# Patient Record
Sex: FEMALE
[De-identification: names, ages, dates, MRNs, and addresses within clinical notes are randomized per-mention and may not be internally consistent; named-entity substitution may affect disease eponyms.]

---

## 2018-03-11 ENCOUNTER — HOSPITAL ENCOUNTER (EMERGENCY)
Dept: HOSPITAL 14 - H.ER | Age: 28
Discharge: HOME | End: 2018-03-11
Payer: COMMERCIAL

## 2018-03-11 VITALS
OXYGEN SATURATION: 98 % | SYSTOLIC BLOOD PRESSURE: 123 MMHG | HEART RATE: 92 BPM | TEMPERATURE: 98 F | DIASTOLIC BLOOD PRESSURE: 96 MMHG | RESPIRATION RATE: 16 BRPM

## 2018-03-11 DIAGNOSIS — R30.0: Primary | ICD-10-CM

## 2018-03-11 DIAGNOSIS — R10.2: ICD-10-CM

## 2018-03-11 LAB
ALBUMIN SERPL-MCNC: 4.4 G/DL (ref 3.5–5)
ALBUMIN/GLOB SERPL: 1.2 {RATIO} (ref 1–2.1)
ALT SERPL-CCNC: 26 U/L (ref 9–52)
AST SERPL-CCNC: 25 U/L (ref 14–36)
BASOPHILS # BLD AUTO: 0.1 K/UL (ref 0–0.2)
BASOPHILS NFR BLD: 0.8 % (ref 0–2)
BILIRUB UR-MCNC: NEGATIVE MG/DL
BUN SERPL-MCNC: 12 MG/DL (ref 7–17)
CALCIUM SERPL-MCNC: 9.6 MG/DL (ref 8.4–10.2)
COLOR UR: (no result)
EOSINOPHIL # BLD AUTO: 0.2 K/UL (ref 0–0.7)
EOSINOPHIL NFR BLD: 1.6 % (ref 0–4)
ERYTHROCYTE [DISTWIDTH] IN BLOOD BY AUTOMATED COUNT: 13.6 % (ref 11.5–14.5)
GFR NON-AFRICAN AMERICAN: > 60
GLUCOSE UR STRIP-MCNC: (no result) MG/DL
HGB BLD-MCNC: 12.9 G/DL (ref 12–16)
LEUKOCYTE ESTERASE UR-ACNC: (no result) LEU/UL
LYMPHOCYTES # BLD AUTO: 2.3 K/UL (ref 1–4.3)
LYMPHOCYTES NFR BLD AUTO: 20.6 % (ref 20–40)
MCH RBC QN AUTO: 29.5 PG (ref 27–31)
MCHC RBC AUTO-ENTMCNC: 33.5 G/DL (ref 33–37)
MCV RBC AUTO: 88.1 FL (ref 81–99)
MONOCYTES # BLD: 0.7 K/UL (ref 0–0.8)
MONOCYTES NFR BLD: 6.2 % (ref 0–10)
NEUTROPHILS # BLD: 7.8 K/UL (ref 1.8–7)
NEUTROPHILS NFR BLD AUTO: 70.8 % (ref 50–75)
NRBC BLD AUTO-RTO: 0.1 % (ref 0–0)
PH UR STRIP: 6 [PH] (ref 5–8)
PLATELET # BLD: 319 K/UL (ref 130–400)
PMV BLD AUTO: 7.5 FL (ref 7.2–11.7)
PROT UR STRIP-MCNC: NEGATIVE MG/DL
RBC # BLD AUTO: 4.39 MIL/UL (ref 3.8–5.2)
RBC # UR STRIP: NEGATIVE /UL
SP GR UR STRIP: 1.01 (ref 1–1.03)
SQUAMOUS EPITHIAL: 1 /HPF (ref 0–5)
URINE CLARITY: CLEAR
UROBILINOGEN UR-MCNC: (no result) MG/DL (ref 0.2–1)
WBC # BLD AUTO: 11 K/UL (ref 4.8–10.8)

## 2018-03-11 PROCEDURE — 99282 EMERGENCY DEPT VISIT SF MDM: CPT

## 2018-03-11 PROCEDURE — 81025 URINE PREGNANCY TEST: CPT

## 2018-03-11 PROCEDURE — 81003 URINALYSIS AUTO W/O SCOPE: CPT

## 2018-03-11 PROCEDURE — 80053 COMPREHEN METABOLIC PANEL: CPT

## 2018-03-11 PROCEDURE — 87086 URINE CULTURE/COLONY COUNT: CPT

## 2018-03-11 PROCEDURE — 85025 COMPLETE CBC W/AUTO DIFF WBC: CPT

## 2018-03-11 PROCEDURE — 76830 TRANSVAGINAL US NON-OB: CPT

## 2018-03-11 NOTE — US
PROCEDURE:  



HISTORY:

right adenxal pain, r/o torsion



COMPARISON:





TECHNIQUE:





FINDINGS:

The uterus measures 6.9 x 3.1 x 4.2 centimeters.  The endometrium 

measures 3 millimeters.  The right ovary measures 4.0 x 1.5 x 2.7 

centimeters.  Left ovary is 3.2 x 2.6 x 2.8 centimeters.  Multiple 

bilateral ovarian follicles are observed.  There is no fluid the 

pelvis.  There is a small amount in the lower uterine segment 



IMPRESSION:

Multiple bilateral ovarian follicles.

## 2018-03-11 NOTE — ED PDOC
HPI: Abdomen


Time Seen by Provider: 18 14:20


Chief Complaint (Nursing): Abdominal Pain


Chief Complaint (Provider): abd pain


History Per: Patient


Additional Complaint(s): 


27-year-old female with history of ovarian cysts presents to emergency 

department with lower abdominal pain and dysuria that started yesterday. 

Patient states she had chills yesterday associated with nausea but no vomiting. 

Patient is not sure if she is pregnant. She just recently restarted taking 

birth control pills last month. Patient is tolerating liquids and solids today. 

She denies concern for STDs at this time but does state about 6 months ago she 

was treated for chlamydia.





PMD: none





Past Medical History


Reviewed: Historical Data, Nursing Documentation, Vital Signs


Vital Signs: 


 Last Vital Signs











Temp  98.0 F   18 14:10


 


Pulse  92 H  18 14:10


 


Resp  16   18 14:10


 


BP  123/96 H  18 14:10


 


Pulse Ox  98   18 17:38














- Medical History


PMH: No Chronic Diseases





- Surgical History


Surgical History: No Surg Hx





- Family History


Family History: States: No Known Family Hx





- Living Arrangements


Living Arrangements: With Family





- Social History


Current smoker - smoking cessation education provided: No


Alcohol: Social


Drugs: Denies





- Home Medications


Home Medications: 


 Ambulatory Orders











 Medication  Instructions  Recorded


 


Nitrofurantoin Macrocrystals 100 mg PO BID #14 cap 18





[Macrobid]  














- Allergies


Allergies/Adverse Reactions: 


 Allergies











Allergy/AdvReac Type Severity Reaction Status Date / Time


 


No Known Allergies Allergy   Verified 18 14:10














Review of Systems


ROS Statement: Except As Marked, All Systems Reviewed And Found Negative


Constitutional: Positive for: Chills.  Negative for: Fever


Gastrointestinal: Positive for: Nausea, Abdominal Pain.  Negative for: Vomiting

, Diarrhea


Genitourinary Female: Positive for: Dysuria.  Negative for: Vaginal Discharge, 

Vaginal Bleeding





Physical Exam





- Reviewed


Nursing Documentation Reviewed: Yes


Vital Signs Reviewed: Yes





- Physical Exam


Appears: Positive for: Well, Non-toxic, No Acute Distress


Head Exam: Positive for: ATRAUMATIC, NORMAL INSPECTION


Skin: Negative for: Rash


Eye Exam: Positive for: Normal appearance


Cardiovascular/Chest: Positive for: Regular Rate, Rhythm


Respiratory: Positive for: Normal Breath Sounds.  Negative for: Wheezing, 

Respiratory Distress


Gastrointestinal/Abdominal: Positive for: Tenderness (Mild tenderness in right 

lower quadrant and left lower quadrant, no rebound, distention or guarding, 

suprapubic tenderness also noted)


Back: Negative for: L CVA Tenderness, R CVA Tenderness


Extremity: Positive for: Normal ROM


Neurologic/Psych: Positive for: Alert, Oriented





- Laboratory Results


Result Diagrams: 


 18 15:40





 18 15:40


Urine Pregnancy POC: Negative





- ECG


O2 Sat by Pulse Oximetry: 98


Pulse Ox Interpretation: Normal





- Other Rad


  ** TV US


X-Ray: Read By Radiologist


X-Ray Interpretation: see below





Medical Decision Making


Medical Decision Makin-year-old female with pelvic pain





Plan:


Urine preg and dip


TV US


CBC


CMP


UA/culture


IVF


Pain meds declined








US:  FINDINGS: The uterus measures 6.9 x 3.1 x 4.2 centimeters.  The 

endometrium measures 3 millimeters.  The right ovary measures 4.0 x 1.5 x 2.7 

centimeters.  Left ovary is 3.2 x 2.6 x 2.8 centimeters.  Multiple bilateral 

ovarian follicles are observed.  There is no fluid the pelvis.  There is a 

small amount in the lower uterine segment IMPRESSION: Multiple bilateral 

ovarian follicles.





Patient is aware of all diagnostic testing results, all questions answered.  

Advised NSAID's for pain and follow up with women's clinic.  Patient given 

prescription for Macrobid secondary to complaint of dysuria.





Disposition





- Clinical Impression


Clinical Impression: 


 Dysuria, Pelvic pain








- Patient ED Disposition


Is Patient to be Admitted: No


Counseled Patient/Family Regarding: Studies Performed, Diagnosis, Need For 

Followup, Rx Given





- Disposition


Referrals: 


[facility] [[facility]]


Disposition: Routine/Home


Disposition Time: 18:15


Condition: STABLE


Additional Instructions: 


Take prescription meds as directed. Over-the-counter Tylenol or Advil for pain 

as needed. Follow-up with gynecologist in 2-3 days.


Prescriptions: 


Nitrofurantoin Macrocrystals [Macrobid] 100 mg PO BID #14 cap


Instructions:  Dysuria, Adult (DC), Acute Pelvic Pain (DC)


Forms:  [facility] (English), [facility] ED School/Work Excuse





Results





- Lab Results


Lab Results: 














  18





  15:44 15:40 15:40


 


WBC    11.0 H


 


RBC    4.39


 


Hgb    12.9


 


Hct    38.7


 


MCV    88.1


 


MCH    29.5


 


MCHC    33.5


 


RDW    13.6


 


Plt Count    319


 


MPV    7.5


 


Neut % (Auto)    70.8


 


Lymph % (Auto)    20.6


 


Mono % (Auto)    6.2


 


Eos % (Auto)    1.6


 


Baso % (Auto)    0.8


 


Neut # (Auto)    7.8 H


 


Lymph # (Auto)    2.3


 


Mono # (Auto)    0.7


 


Eos # (Auto)    0.2


 


Baso # (Auto)    0.1


 


Sodium   140 


 


Potassium   4.6 


 


Chloride   99 


 


Carbon Dioxide   26 


 


Anion Gap   20 


 


BUN   12 


 


Creatinine   0.6 L 


 


Est GFR ( Amer)   > 60 


 


Est GFR (Non-Af Amer)   > 60 


 


Random Glucose   89 


 


Calcium   9.6 


 


Total Bilirubin   0.4 


 


AST   25 


 


ALT   26 


 


Alkaline Phosphatase   51 


 


Total Protein   8.1 


 


Albumin   4.4 


 


Globulin   3.7 


 


Albumin/Globulin Ratio   1.2 


 


Urine Color  Straw  


 


Urine Clarity  Clear  


 


Urine pH  6.0  


 


Ur Specific Gravity  1.008  


 


Urine Protein  Negative  


 


Urine Glucose (UA)  Neg  


 


Urine Ketones  Negative  


 


Urine Blood  Negative  


 


Urine Nitrate  Negative  


 


Urine Bilirubin  Negative  


 


Urine Urobilinogen  0.2-1.0  


 


Ur Leukocyte Esterase  Neg  


 


Urine RBC (Auto)  2  


 


Urine Microscopic WBC  1  


 


Ur Squamous Epith Cells  1